# Patient Record
Sex: MALE | Race: BLACK OR AFRICAN AMERICAN | Employment: FULL TIME
[De-identification: names, ages, dates, MRNs, and addresses within clinical notes are randomized per-mention and may not be internally consistent; named-entity substitution may affect disease eponyms.]

---

## 2017-03-10 ENCOUNTER — TELEPHONE (OUTPATIENT)
Dept: OTHER | Facility: CLINIC | Age: 62
End: 2017-03-10

## 2018-09-04 ENCOUNTER — OFFICE VISIT (OUTPATIENT)
Dept: INTERNAL MEDICINE | Age: 63
End: 2018-09-04

## 2018-09-04 ENCOUNTER — TELEPHONE (OUTPATIENT)
Dept: INTERNAL MEDICINE | Age: 63
End: 2018-09-04

## 2018-09-04 VITALS
DIASTOLIC BLOOD PRESSURE: 90 MMHG | HEIGHT: 70 IN | SYSTOLIC BLOOD PRESSURE: 138 MMHG | WEIGHT: 250 LBS | BODY MASS INDEX: 35.79 KG/M2

## 2018-09-04 DIAGNOSIS — G44.029 CHRONIC CLUSTER HEADACHE, NOT INTRACTABLE: ICD-10-CM

## 2018-09-04 DIAGNOSIS — E66.9 MILD OBESITY: ICD-10-CM

## 2018-09-04 DIAGNOSIS — E79.0 HYPERURICEMIA: ICD-10-CM

## 2018-09-04 DIAGNOSIS — I10 ESSENTIAL HYPERTENSION: ICD-10-CM

## 2018-09-04 DIAGNOSIS — M10.9 ACUTE GOUT INVOLVING TOE OF RIGHT FOOT, UNSPECIFIED CAUSE: Primary | ICD-10-CM

## 2018-09-04 DIAGNOSIS — M10.9 GOUT OF BIG TOE: ICD-10-CM

## 2018-09-04 PROCEDURE — 99214 OFFICE O/P EST MOD 30 MIN: CPT | Performed by: INTERNAL MEDICINE

## 2018-09-04 RX ORDER — HYDROCODONE BITARTRATE AND ACETAMINOPHEN 5; 325 MG/1; MG/1
1 TABLET ORAL EVERY 6 HOURS PRN
Qty: 10 TABLET | Refills: 0 | Status: SHIPPED | OUTPATIENT
Start: 2018-09-04 | End: 2018-09-07

## 2018-09-04 RX ORDER — METHYLPREDNISOLONE 4 MG/1
TABLET ORAL
Qty: 1 KIT | Refills: 0 | Status: SHIPPED | OUTPATIENT
Start: 2018-09-04

## 2018-09-04 ASSESSMENT — ENCOUNTER SYMPTOMS
CHEST TIGHTNESS: 0
ABDOMINAL PAIN: 0
COLOR CHANGE: 1
SHORTNESS OF BREATH: 0
RESPIRATORY NEGATIVE: 1

## 2018-09-04 ASSESSMENT — PATIENT HEALTH QUESTIONNAIRE - PHQ9
SUM OF ALL RESPONSES TO PHQ9 QUESTIONS 1 & 2: 0
1. LITTLE INTEREST OR PLEASURE IN DOING THINGS: 0
2. FEELING DOWN, DEPRESSED OR HOPELESS: 0
SUM OF ALL RESPONSES TO PHQ QUESTIONS 1-9: 0
SUM OF ALL RESPONSES TO PHQ QUESTIONS 1-9: 0

## 2018-09-04 NOTE — ASSESSMENT & PLAN NOTE
Not being monitored or treated - to monitor at home and bring in record of bp's at physical w Dr. Kourtney Encarnacion soon

## 2018-09-04 NOTE — TELEPHONE ENCOUNTER
Pt states he has a Gout in right foot.  Pt states it is very painful and would like to know what can he take for the pain until his appt today at 3:15pm w/Dr. Sonny Colin

## 2018-09-04 NOTE — PATIENT INSTRUCTIONS
Take the 2 naprosyn 3 times a day for a 2-3 days then 2 twice ALWAYS w food- if you have any stomach upset stop it and take medrol pac  ALSO if its not helping in a few days stop the naprosyn and take the medrol   Ice ice ice  Once the gout flare is over do your labs and see Dr. Pranav James for a physical  Call a week or so before physical for lab orders and be sure they include a uric acid

## 2018-09-04 NOTE — PROGRESS NOTES
Subjective:      Patient ID: Kaela Andersen is a 59 y.o. male. Chief Complaint   Patient presents with    Foot Swelling     swollen right footx 4 days hx gout        Has had flare of severe gout in right foot-asking for allopurinol-was given naprosyn this am-has been off the allopurinol for years. This is first flare in 3 yrs. Not watching diet very well at all and no progress w wt loss. Has not had any episodes of cluster h/a recently. Not monitoring his bp's. Review of Systems   Constitutional: Negative for appetite change and fatigue. HENT: Negative. Respiratory: Negative. Negative for chest tightness and shortness of breath. Cardiovascular: Negative for chest pain and palpitations. Gastrointestinal: Negative for abdominal pain. Genitourinary: Negative. Musculoskeletal: Positive for gait problem and joint swelling. Skin: Positive for color change. Neurological: Negative for weakness. Psychiatric/Behavioral: Negative for dysphoric mood and sleep disturbance. The patient is not nervous/anxious. Objective:   Physical Exam   Constitutional: He is oriented to person, place, and time. He appears well-developed and well-nourished. HENT:   Head: Normocephalic and atraumatic. Mouth/Throat: No oropharyngeal exudate. Eyes: Pupils are equal, round, and reactive to light. Conjunctivae and EOM are normal. No scleral icterus. Neck: No tracheal deviation present. No thyromegaly present. Cardiovascular: Normal rate and regular rhythm. Exam reveals no gallop and no friction rub. Pulmonary/Chest: Effort normal and breath sounds normal. No respiratory distress. He has no wheezes. He has no rales. Abdominal: He exhibits no distension. There is no tenderness. There is no rebound and no guarding. Musculoskeletal: He exhibits no edema or tenderness. Erythema and warmth of right 1st MT/P joint    Neurological: He is alert and oriented to person, place, and time.  No cranial nerve

## 2018-09-04 NOTE — ASSESSMENT & PLAN NOTE
Recurrent- explained at great length to patient that allopurinol at this point is not a good idea. He says that he was allergic to prednisone in the past but on further discussion the rash that he blamed on the prednisone actually started with penicillin prescribed by his dentist. The penicillin was continued and he was given prednisone to help cover the rash/allergic reaction.  We will try to treat with Naprosyn only but if that's not effective she will need to take the Medrol Dosepak and then this hyperuricemia will need to be addressed